# Patient Record
Sex: MALE | Race: WHITE | NOT HISPANIC OR LATINO | URBAN - METROPOLITAN AREA
[De-identification: names, ages, dates, MRNs, and addresses within clinical notes are randomized per-mention and may not be internally consistent; named-entity substitution may affect disease eponyms.]

---

## 2020-05-13 PROBLEM — Z00.00 ENCOUNTER FOR PREVENTIVE HEALTH EXAMINATION: Status: ACTIVE | Noted: 2020-05-13

## 2023-04-26 ENCOUNTER — EMERGENCY (EMERGENCY)
Facility: HOSPITAL | Age: 38
LOS: 0 days | Discharge: HOME | End: 2023-04-27
Attending: EMERGENCY MEDICINE
Payer: COMMERCIAL

## 2023-04-26 VITALS
SYSTOLIC BLOOD PRESSURE: 142 MMHG | TEMPERATURE: 98 F | OXYGEN SATURATION: 96 % | HEART RATE: 93 BPM | WEIGHT: 229.94 LBS | HEIGHT: 66 IN | DIASTOLIC BLOOD PRESSURE: 98 MMHG

## 2023-04-26 DIAGNOSIS — F17.290 NICOTINE DEPENDENCE, OTHER TOBACCO PRODUCT, UNCOMPLICATED: ICD-10-CM

## 2023-04-26 DIAGNOSIS — R07.89 OTHER CHEST PAIN: ICD-10-CM

## 2023-04-26 PROCEDURE — 84484 ASSAY OF TROPONIN QUANT: CPT

## 2023-04-26 PROCEDURE — 83880 ASSAY OF NATRIURETIC PEPTIDE: CPT

## 2023-04-26 PROCEDURE — 80053 COMPREHEN METABOLIC PANEL: CPT

## 2023-04-26 PROCEDURE — 36415 COLL VENOUS BLD VENIPUNCTURE: CPT

## 2023-04-26 PROCEDURE — 71045 X-RAY EXAM CHEST 1 VIEW: CPT

## 2023-04-26 PROCEDURE — 99285 EMERGENCY DEPT VISIT HI MDM: CPT | Mod: 25

## 2023-04-26 PROCEDURE — 83735 ASSAY OF MAGNESIUM: CPT

## 2023-04-26 PROCEDURE — 93010 ELECTROCARDIOGRAM REPORT: CPT

## 2023-04-26 PROCEDURE — 71045 X-RAY EXAM CHEST 1 VIEW: CPT | Mod: 26

## 2023-04-26 PROCEDURE — 99285 EMERGENCY DEPT VISIT HI MDM: CPT

## 2023-04-26 PROCEDURE — 93005 ELECTROCARDIOGRAM TRACING: CPT

## 2023-04-26 PROCEDURE — G0378: CPT

## 2023-04-26 PROCEDURE — 85025 COMPLETE CBC W/AUTO DIFF WBC: CPT

## 2023-04-26 PROCEDURE — 75574 CT ANGIO HRT W/3D IMAGE: CPT | Mod: MA

## 2023-04-26 NOTE — ED ADULT TRIAGE NOTE - CHIEF COMPLAINT QUOTE
Pt presents to the ED w/ c/o of chest discomfort throbbing-like after running after another person. Pain has subsided but still has lingering chest discomfort. Denies PMHx. Endorses smoking cigars. Denies familial cardiac history. Pt presents to the ED w/ c/o of chest discomfort throbbing-like in the left pectoral after running after another person. Pain has subsided but still has lingering chest discomfort. Denies PMHx. Endorses smoking cigars. Denies familial cardiac history.

## 2023-04-27 VITALS
TEMPERATURE: 98 F | RESPIRATION RATE: 18 BRPM | OXYGEN SATURATION: 98 % | SYSTOLIC BLOOD PRESSURE: 109 MMHG | DIASTOLIC BLOOD PRESSURE: 79 MMHG | HEART RATE: 65 BPM

## 2023-04-27 LAB
ALBUMIN SERPL ELPH-MCNC: 4.9 G/DL — SIGNIFICANT CHANGE UP (ref 3.5–5.2)
ALP SERPL-CCNC: 83 U/L — SIGNIFICANT CHANGE UP (ref 30–115)
ALT FLD-CCNC: 47 U/L — HIGH (ref 0–41)
ANION GAP SERPL CALC-SCNC: 13 MMOL/L — SIGNIFICANT CHANGE UP (ref 7–14)
AST SERPL-CCNC: 35 U/L — SIGNIFICANT CHANGE UP (ref 0–41)
BASOPHILS # BLD AUTO: 0.08 K/UL — SIGNIFICANT CHANGE UP (ref 0–0.2)
BASOPHILS NFR BLD AUTO: 0.9 % — SIGNIFICANT CHANGE UP (ref 0–1)
BILIRUB SERPL-MCNC: 0.4 MG/DL — SIGNIFICANT CHANGE UP (ref 0.2–1.2)
BUN SERPL-MCNC: 19 MG/DL — SIGNIFICANT CHANGE UP (ref 10–20)
CALCIUM SERPL-MCNC: 9.6 MG/DL — SIGNIFICANT CHANGE UP (ref 8.4–10.5)
CHLORIDE SERPL-SCNC: 101 MMOL/L — SIGNIFICANT CHANGE UP (ref 98–110)
CO2 SERPL-SCNC: 27 MMOL/L — SIGNIFICANT CHANGE UP (ref 17–32)
CREAT SERPL-MCNC: 1.2 MG/DL — SIGNIFICANT CHANGE UP (ref 0.7–1.5)
EGFR: 80 ML/MIN/1.73M2 — SIGNIFICANT CHANGE UP
EOSINOPHIL # BLD AUTO: 0.17 K/UL — SIGNIFICANT CHANGE UP (ref 0–0.7)
EOSINOPHIL NFR BLD AUTO: 1.8 % — SIGNIFICANT CHANGE UP (ref 0–8)
GLUCOSE SERPL-MCNC: 92 MG/DL — SIGNIFICANT CHANGE UP (ref 70–99)
HCT VFR BLD CALC: 44.6 % — SIGNIFICANT CHANGE UP (ref 42–52)
HGB BLD-MCNC: 15.4 G/DL — SIGNIFICANT CHANGE UP (ref 14–18)
IMM GRANULOCYTES NFR BLD AUTO: 0.3 % — SIGNIFICANT CHANGE UP (ref 0.1–0.3)
LYMPHOCYTES # BLD AUTO: 2.22 K/UL — SIGNIFICANT CHANGE UP (ref 1.2–3.4)
LYMPHOCYTES # BLD AUTO: 23.6 % — SIGNIFICANT CHANGE UP (ref 20.5–51.1)
MAGNESIUM SERPL-MCNC: 2.1 MG/DL — SIGNIFICANT CHANGE UP (ref 1.8–2.4)
MCHC RBC-ENTMCNC: 29.2 PG — SIGNIFICANT CHANGE UP (ref 27–31)
MCHC RBC-ENTMCNC: 34.5 G/DL — SIGNIFICANT CHANGE UP (ref 32–37)
MCV RBC AUTO: 84.5 FL — SIGNIFICANT CHANGE UP (ref 80–94)
MONOCYTES # BLD AUTO: 0.7 K/UL — HIGH (ref 0.1–0.6)
MONOCYTES NFR BLD AUTO: 7.4 % — SIGNIFICANT CHANGE UP (ref 1.7–9.3)
NEUTROPHILS # BLD AUTO: 6.21 K/UL — SIGNIFICANT CHANGE UP (ref 1.4–6.5)
NEUTROPHILS NFR BLD AUTO: 66 % — SIGNIFICANT CHANGE UP (ref 42.2–75.2)
NRBC # BLD: 0 /100 WBCS — SIGNIFICANT CHANGE UP (ref 0–0)
NT-PROBNP SERPL-SCNC: <5 PG/ML — SIGNIFICANT CHANGE UP (ref 0–300)
PLATELET # BLD AUTO: 242 K/UL — SIGNIFICANT CHANGE UP (ref 130–400)
PMV BLD: 10.3 FL — SIGNIFICANT CHANGE UP (ref 7.4–10.4)
POTASSIUM SERPL-MCNC: 4.9 MMOL/L — SIGNIFICANT CHANGE UP (ref 3.5–5)
POTASSIUM SERPL-SCNC: 4.9 MMOL/L — SIGNIFICANT CHANGE UP (ref 3.5–5)
PROT SERPL-MCNC: 7.5 G/DL — SIGNIFICANT CHANGE UP (ref 6–8)
RBC # BLD: 5.28 M/UL — SIGNIFICANT CHANGE UP (ref 4.7–6.1)
RBC # FLD: 12.4 % — SIGNIFICANT CHANGE UP (ref 11.5–14.5)
SODIUM SERPL-SCNC: 141 MMOL/L — SIGNIFICANT CHANGE UP (ref 135–146)
TROPONIN T SERPL-MCNC: <0.01 NG/ML — SIGNIFICANT CHANGE UP
TROPONIN T SERPL-MCNC: <0.01 NG/ML — SIGNIFICANT CHANGE UP
WBC # BLD: 9.41 K/UL — SIGNIFICANT CHANGE UP (ref 4.8–10.8)
WBC # FLD AUTO: 9.41 K/UL — SIGNIFICANT CHANGE UP (ref 4.8–10.8)

## 2023-04-27 PROCEDURE — 93010 ELECTROCARDIOGRAM REPORT: CPT

## 2023-04-27 PROCEDURE — 75574 CT ANGIO HRT W/3D IMAGE: CPT | Mod: 26,MA

## 2023-04-27 PROCEDURE — 99236 HOSP IP/OBS SAME DATE HI 85: CPT

## 2023-04-27 RX ORDER — METOPROLOL TARTRATE 50 MG
50 TABLET ORAL ONCE
Refills: 0 | Status: COMPLETED | OUTPATIENT
Start: 2023-04-27 | End: 2023-04-27

## 2023-04-27 RX ORDER — METOPROLOL TARTRATE 50 MG
100 TABLET ORAL ONCE
Refills: 0 | Status: COMPLETED | OUTPATIENT
Start: 2023-04-27 | End: 2023-04-27

## 2023-04-27 RX ORDER — METOPROLOL TARTRATE 50 MG
10 TABLET ORAL ONCE
Refills: 0 | Status: DISCONTINUED | OUTPATIENT
Start: 2023-04-27 | End: 2023-04-27

## 2023-04-27 RX ADMIN — Medication 50 MILLIGRAM(S): at 08:35

## 2023-04-27 RX ADMIN — Medication 100 MILLIGRAM(S): at 06:47

## 2023-04-27 NOTE — ED CDU PROVIDER INITIAL DAY NOTE - PHYSICAL EXAMINATION
CONSTITUTIONAL: in no apparent distress.   HEAD: Normocephalic; atraumatic.   EYES: Pupils are round and reactive, extra-ocular muscles are intact. Eyelids are normal in appearance without swelling or lesions.   ENT: Hearing is intact with good acuity to spoken voice.  Patient is speaking clearly, not muffled and airway is intact.   RESPIRATORY: No signs of respiratory distress. Lung sounds are clear in all lobes bilaterally without rales, rhonchi, or wheezes.  CARDIOVASCULAR: Regular rate and rhythm.   GI: Abdomen is soft, non-tender, and without distention. Bowel sounds are present and normoactive in all four quadrants. No masses are noted.   NEURO: A & O x 3. Normal speech. No focal deficit.  PSYCHOLOGICAL: Appropriate mood and affect. Good judgement and insight

## 2023-04-27 NOTE — ED ADULT NURSE NOTE - CHIEF COMPLAINT QUOTE
Pt presents to the ED w/ c/o of chest discomfort throbbing-like in the left pectoral after running after another person. Pain has subsided but still has lingering chest discomfort. Denies PMHx. Endorses smoking cigars. Denies familial cardiac history.

## 2023-04-27 NOTE — ED PROVIDER NOTE - CLINICAL SUMMARY MEDICAL DECISION MAKING FREE TEXT BOX
Labs and EKG were ordered and reviewed, where indicated.  Imaging was ordered and reviewed by me, where indicated.  Appropriate medications for patient's presenting complaints were ordered and effects were reassessed, where indicated.  Patient's records (prior hospital, ED visit, and/or nursing home note) were reviewed, if available.  Additional history was obtained from EMS, family, and/or PCP (where available).  Escalation to admission/observation was considered. Patient requires inpatient hospitalization - monitored setting.     exertional CP - ekg nsr, cxr clear, labs/tn nml - d/w PCP Dr. Olson, a/w plan for EDOU for ACS work-up

## 2023-04-27 NOTE — ED PROVIDER NOTE - OBJECTIVE STATEMENT
38-year-old male with no signet past medical history who presents with chest pain.  Reports that he is a  and chest pain started tonight after he chased a suspect; pain is in the left side of the chest, dullness/tightness, radiating to the left arm, constant pain, and there is no alleviating or worsening factor.  Admits using cigar, but denies family history of cardiac problems.  Denies recent trauma, immobilization, surgery, hospitalization, and history of blood clot.  Patient was seen by a cardiologist a year ago and had a stress test 3 years ago; however, does not remember the result of the stress test. Denies fever, shortness of breath, nausea, vomiting, abdominal pain, urinary symptoms, change in bowel movement.

## 2023-04-27 NOTE — ED CDU PROVIDER DISPOSITION NOTE - NSFOLLOWUPINSTRUCTIONS_ED_ALL_ED_FT
1. Follow up with Dr Olson as scheduled.  2. Return to the ED for persistent or worsening sx.    Chest Pain    Chest pain can be caused by many different conditions which may or may not be dangerous. Causes include heartburn, lung infections, heart attack, blood clot in lungs, skin infections, strain or damage to muscle, cartilage, or bones, etc. In addition to a history and physical examination, an electrocardiogram (ECG) or other lab tests may have been performed to determine the cause of your chest pain. Follow up with your primary care provider or with a cardiologist as instructed.     SEEK IMMEDIATE MEDICAL CARE IF YOU HAVE ANY OF THE FOLLOWING SYMPTOMS: worsening chest pain, coughing up blood, unexplained back/neck/jaw pain, severe abdominal pain, dizziness or lightheadedness, fainting, shortness of breath, sweaty or clammy skin, vomiting, or racing heart beat. These symptoms may represent a serious problem that is an emergency. Do not wait to see if the symptoms will go away. Get medical help right away. Call 911 and do not drive yourself to the hospital.

## 2023-04-27 NOTE — ED CDU PROVIDER DISPOSITION NOTE - CLINICAL COURSE
38-year-old man, no significant past medical history was placed in CDU for work-up of chest tightness that began after exertion, left-sided, no shortness of breath.  ED work-up was unremarkable.  Patient is very well-appearing on my eval.  Lungs clear, CV S1-S2, abdomen soft.  No peripheral edema.  ED work-up was unremarkable.  Pt was monitored without incident, repeat EKG and enzymes unchanged. CCTA was CAD-RAD 0 for normal coronary arteries.

## 2023-04-27 NOTE — ED ADULT NURSE REASSESSMENT NOTE - NS ED NURSE REASSESS COMMENT FT1
as per daughter, patient's abdominal discomforts has improved greatly. patient is discharged. patient denied any discomforts. to monitor.

## 2023-04-27 NOTE — ED PROVIDER NOTE - ATTENDING APP SHARED VISIT CONTRIBUTION OF CARE
38-year-old male smoker no PMH presenting with chest pain.  Patient is [employer], reports left upper chest pain after chasing after after someone.  Aching, nonradiating, exertional, nonpleuritic, no associated symptoms.  Still with lingering pain denies dizziness, SOB/GAMBOA, N/V, abdominal, edema no family history of no recent travel, surgery, immobilization, hormone use.  Heart disease or SCD.  PCP Dr. Olson.    PE:  nad  skin warm, dry  ncat  neck supple  rrr nl s1s2 no mrg  ctab no wrr  abd soft ntnd no palpable masses no rgr  back non-tender no cvat  ext no cce dpi  neuro aaox3 grossly nf exam

## 2024-05-14 NOTE — ED PROVIDER NOTE - WR ORDER NAME 1
[No Acute Distress] : no acute distress [Normal Oropharynx] : normal oropharynx [IV] : Mallampati Class: IV [Normal Appearance] : normal appearance [No Neck Mass] : no neck mass [Normal Rate/Rhythm] : normal rate/rhythm Xray Chest 1 View- PORTABLE-Urgent [Normal S1, S2] : normal s1, s2 [No Murmurs] : no murmurs [No Resp Distress] : no resp distress [Clear to Auscultation Bilaterally] : clear to auscultation bilaterally [No Abnormalities] : no abnormalities [Benign] : benign [No Clubbing] : no clubbing [No Cyanosis] : no cyanosis [No Edema] : no edema [Oriented x3] : oriented x3 [Normal Affect] : normal affect [TextBox_2] : morbidly obese

## 2024-07-16 PROBLEM — Z78.9 OTHER SPECIFIED HEALTH STATUS: Chronic | Status: ACTIVE | Noted: 2023-04-27

## 2024-08-22 ENCOUNTER — APPOINTMENT (OUTPATIENT)
Dept: PULMONOLOGY | Facility: CLINIC | Age: 39
End: 2024-08-22
Payer: COMMERCIAL

## 2024-08-22 VITALS
DIASTOLIC BLOOD PRESSURE: 84 MMHG | RESPIRATION RATE: 14 BRPM | OXYGEN SATURATION: 97 % | HEART RATE: 73 BPM | SYSTOLIC BLOOD PRESSURE: 122 MMHG | WEIGHT: 249 LBS | BODY MASS INDEX: 37.74 KG/M2 | HEIGHT: 68 IN

## 2024-08-22 DIAGNOSIS — G47.10 HYPERSOMNIA, UNSPECIFIED: ICD-10-CM

## 2024-08-22 DIAGNOSIS — G47.30 HYPERSOMNIA, UNSPECIFIED: ICD-10-CM

## 2024-08-22 DIAGNOSIS — F17.290 NICOTINE DEPENDENCE, OTHER TOBACCO PRODUCT, UNCOMPLICATED: ICD-10-CM

## 2024-08-22 DIAGNOSIS — E66.9 OBESITY, UNSPECIFIED: ICD-10-CM

## 2024-08-22 DIAGNOSIS — G47.33 OBSTRUCTIVE SLEEP APNEA (ADULT) (PEDIATRIC): ICD-10-CM

## 2024-08-22 PROBLEM — Z00.00 ENCOUNTER FOR PREVENTIVE HEALTH EXAMINATION: Status: ACTIVE | Noted: 2024-08-22

## 2024-08-22 PROCEDURE — 99203 OFFICE O/P NEW LOW 30 MIN: CPT

## 2024-08-22 NOTE — HISTORY OF PRESENT ILLNESS
[TextBox_4] : - Summary : The patient, Francesco, is a stomach sleeper and experiences choking sensations when sleeping on his back. His wife reports that his snoring is severe. He reports feeling tired and run-down upon waking, despite getting adequate sleep hours. - Chief Complaint (CC) : Snoring and choking sensations while sleeping. - History of Present Illness (HPI) : - Francesco has been a stomach sleeper for a long time.  - If he sleeps on his back or falls asleep on his back, he wakes up choking.  - He experiences a choking sensation and turns over to his stomach.  - His wife reports that his snoring is bad.  - He has to stretch one leg out while sleeping on his stomach, otherwise his lower back starts to get compensated.  - He goes to bed around 8:30-9:00 PM and wakes up feeling tired and run-down, despite getting adequate sleep hours.

## 2024-08-22 NOTE — ASSESSMENT
[FreeTextEntry1] : Assessment: - Summary : Based on the patient's reported symptoms and history, the healthcare provider suspects that the patient is suffering from obstructive sleep apnea (IDRIS). The provider explains the underlying mechanism of IDRIS, which involves the relaxation of the throat muscles during sleep, leading to airway obstruction and disrupted breathing. - Problems : - Suspected Obstructive Sleep Apnea (IDRIS)  - Differential Diagnosis : Plan: - Summary : The healthcare provider recommends that the patient undergo a home sleep study to confirm the diagnosis of obstructive sleep apnea and determine its severity. If the sleep study results indicate significant sleep apnea, the provider plans to prescribe a CPAP (Continuous Positive Airway Pressure) machine for the patient. The provider also emphasizes the importance of using the CPAP machine consistently and scheduling a follow-up appointment in three months to monitor compliance and treatment effectiveness. - Plan : - Order a home sleep study for the patient and his wife.  - If the sleep study confirms significant sleep apnea, prescribe a CPAP machine for the patient.  - Educate the patient on the proper use and importance of consistent CPAP machine usage.  - Schedule a three-month follow-up appointment to monitor treatment compliance and effectiveness.  - Provide a letter to the patient's primary care physician, Dr. Olson, regarding the suspected sleep apnea and planned treatment.  .wt loss  ICD 10 Code

## 2024-08-30 ENCOUNTER — OUTPATIENT (OUTPATIENT)
Dept: OUTPATIENT SERVICES | Facility: HOSPITAL | Age: 39
LOS: 1 days | Discharge: HOME | End: 2024-08-30
Payer: COMMERCIAL

## 2024-08-30 ENCOUNTER — APPOINTMENT (OUTPATIENT)
Dept: SLEEP CENTER | Facility: HOSPITAL | Age: 39
End: 2024-08-30

## 2024-08-30 DIAGNOSIS — G47.33 OBSTRUCTIVE SLEEP APNEA (ADULT) (PEDIATRIC): ICD-10-CM

## 2024-08-30 PROCEDURE — 95800 SLP STDY UNATTENDED: CPT | Mod: 26

## 2024-08-30 PROCEDURE — 95800 SLP STDY UNATTENDED: CPT

## 2024-09-05 DIAGNOSIS — G47.33 OBSTRUCTIVE SLEEP APNEA (ADULT) (PEDIATRIC): ICD-10-CM

## 2024-12-24 ENCOUNTER — OUTPATIENT (OUTPATIENT)
Dept: OUTPATIENT SERVICES | Facility: HOSPITAL | Age: 39
LOS: 1 days | End: 2024-12-24
Payer: COMMERCIAL

## 2024-12-24 DIAGNOSIS — M54.59 OTHER LOW BACK PAIN: ICD-10-CM

## 2024-12-24 DIAGNOSIS — Z00.8 ENCOUNTER FOR OTHER GENERAL EXAMINATION: ICD-10-CM

## 2024-12-24 PROCEDURE — 72148 MRI LUMBAR SPINE W/O DYE: CPT | Mod: 26

## 2024-12-24 PROCEDURE — 72148 MRI LUMBAR SPINE W/O DYE: CPT

## 2024-12-25 DIAGNOSIS — M54.59 OTHER LOW BACK PAIN: ICD-10-CM

## 2025-01-07 ENCOUNTER — TRANSCRIPTION ENCOUNTER (OUTPATIENT)
Age: 40
End: 2025-01-07

## 2025-01-10 ENCOUNTER — APPOINTMENT (OUTPATIENT)
Dept: ORTHOPEDIC SURGERY | Facility: CLINIC | Age: 40
End: 2025-01-10
Payer: OTHER MISCELLANEOUS

## 2025-01-10 DIAGNOSIS — S33.5XXS SPRAIN OF LIGAMENTS OF LUMBAR SPINE, SEQUELA: ICD-10-CM

## 2025-01-10 PROCEDURE — 99203 OFFICE O/P NEW LOW 30 MIN: CPT
